# Patient Record
Sex: FEMALE | Race: BLACK OR AFRICAN AMERICAN | NOT HISPANIC OR LATINO | URBAN - METROPOLITAN AREA
[De-identification: names, ages, dates, MRNs, and addresses within clinical notes are randomized per-mention and may not be internally consistent; named-entity substitution may affect disease eponyms.]

---

## 2023-10-26 ENCOUNTER — EMERGENCY (EMERGENCY)
Facility: HOSPITAL | Age: 23
LOS: 1 days | Discharge: HOME | End: 2023-10-26
Admitting: STUDENT IN AN ORGANIZED HEALTH CARE EDUCATION/TRAINING PROGRAM
Payer: SELF-PAY

## 2023-10-26 VITALS
SYSTOLIC BLOOD PRESSURE: 122 MMHG | DIASTOLIC BLOOD PRESSURE: 67 MMHG | RESPIRATION RATE: 16 BRPM | HEART RATE: 80 BPM | OXYGEN SATURATION: 100 % | TEMPERATURE: 99 F

## 2023-10-26 PROCEDURE — 99284 EMERGENCY DEPT VISIT MOD MDM: CPT

## 2023-10-26 PROCEDURE — 93971 EXTREMITY STUDY: CPT | Mod: 26,LT

## 2023-10-26 RX ORDER — IBUPROFEN 200 MG
600 TABLET ORAL ONCE
Refills: 0 | Status: COMPLETED | OUTPATIENT
Start: 2023-10-26 | End: 2023-10-26

## 2023-10-26 RX ADMIN — Medication 600 MILLIGRAM(S): at 15:59

## 2023-10-26 NOTE — ED ADULT TRIAGE NOTE - CHIEF COMPLAINT QUOTE
pt sent by urgent care to r/o DVT, c/o left lower leg swelling, lump, and lower leg pain X couple of weeks. reports swelling noted on 10/24/23. denies any past medical history.

## 2023-10-26 NOTE — ED PROVIDER NOTE - NS ED ATTENDING NAME FT
Patient Instructions    Continue taking your vancomycin taper dose until completed.     A referral has been made for FMT at [facility].     Call with any questions or concerns.      On behalf of my care team, I would like to thank you for entrusting us with your care today. It is our goal to provide you and your family with excellent care.      If you have any questions or experience any problems, I encourage you to call or visit our office.  If there is anything that we can do to serve you better, please let us know by calling [phone].     Thank you.   Jenn Martin M.D.    
[place]

## 2023-10-26 NOTE — ED ADULT NURSE NOTE - OBJECTIVE STATEMENT
pt to wellness, a&ox3 and ambulatory at baseline, presents to ED from ortho MD for increased fluid in the knee and to r/o DVT due to calf tenderness. approx 1 month ago pt reports twisting her L knee and heard a "pop" and is no having increased swelling and pain around L knee and L calf. pt able to bear weight, +pedal pulses, +/= sensation. pt denies numbness/tingling, chest pain, sob, n/v/d, recent travel. NAD noted at this time. respirations even and nonlabored on room air. medicated as ordered. comfort and safety maintained. pending US.

## 2023-10-26 NOTE — ED PROVIDER NOTE - NS_EDPROVIDERDISPOUSERTYPE_ED_A_ED
Normal for race
I have personally evaluated and examined the patient. The Attending was available to me as a supervising provider if needed.

## 2023-10-26 NOTE — ED PROVIDER NOTE - PROGRESS NOTE DETAILS
CHANTALE Chatterjee: USr without findings of DVT.   Patient is medically stable for discharge. Strict return precautions given. Patient to follow up with ortho. Patient displays understanding and agreeable with plan, comfortable with discharge plan home.

## 2023-10-26 NOTE — ED PROVIDER NOTE - PATIENT PORTAL LINK FT
You can access the FollowMyHealth Patient Portal offered by [facility] by registering at the following website: [email or web address]. By joining [employer]’s FollowMyHealth portal, you will also be able to view your health information using other applications (apps) compatible with our system.

## 2023-10-26 NOTE — ED PROVIDER NOTE - CLINICAL SUMMARY MEDICAL DECISION MAKING FREE TEXT BOX
22 y/o F with no pmhx sent to the ED by ortho c/o L knee and calf pain and swelling for r/o DVT.     Patient currently afebrile, hemodynamically stable, spO2 100%. Physical exam findings: well-appearing. Normal heart and lung sounds. +L calf tenderness and L knee tenderness to palpation along medial aspect with swelling noted in medial aspect and superior aspect of L knee. Physical exam otherwise unremarkable.  Based on history and physical, differentials include but are not limited to DVT vs knee effusion vs bursitis. Low suspicion for acute fx at this time. Plan to assess patient for acute pathology as listed above with US. Will administer medications for symptomatic relief, follow-up on results, and reassess. If workup negative, likely d/c home with ortho f/u. 24 y/o F with no pmhx sent to the ED by ortho c/o L knee and calf pain and swelling for r/o DVT.     Patient currently afebrile, hemodynamically stable, spO2 100%. Physical exam findings: well-appearing. Normal heart and lung sounds. +L calf tenderness and L knee tenderness to palpation along medial aspect with swelling noted in medial aspect and superior aspect of L knee. Physical exam otherwise unremarkable.  Based on history and physical, differentials include but are not limited to DVT vs knee effusion vs bursitis. Low suspicion for acute fx at this time. Plan to assess patient for acute pathology as listed above with US. Will administer medications for symptomatic relief, follow-up on results, and reassess. If workup negative, likely d/c home with ortho f/u.    CHANTALE Jackson:  I have personally seen and examined this patient.  I have fully participated in the care of this patient. I have reviewed all pertinent clinical information, including history, physical exam, plan and PA fellow's note and agree as noted.

## 2023-10-26 NOTE — ED PROVIDER NOTE - OBJECTIVE STATEMENT
24 y/o F with no pmhx sent to the ED by ortho c/o L knee and calf pain and swelling for r/o DVT. Pt reports she was at work when she twisted her knee and felt a "pop" about a month ago. Pt states that she immediately felt pain after and was using IcyHot with relief of pain. On Tuesday, 10/24/23, pt started to note increased swelling and pain around the L knee and calf. Pt was seen by ortho today for increased fluid in the knee and was referred to ED for r/o DVT given calf tenderness. Pt did not take anything for pain today. Denies recent fever/chills, skin changes or erythema in the LLE, ankle pain or foot pain, extremity numbness/tingling, CP or SOB, inability to ambulate or difficulty with ambulation, recent travel, OCP or hormone use, smoking/etoh/druh use hx. Pt currently menstruating. NKDA. Denies surgical hx.

## 2023-10-26 NOTE — ED PROVIDER NOTE - NSFOLLOWUPINSTRUCTIONS_ED_ALL_ED_FT
You were seen in the ED for knee pain. There was no evidence of a deep venous thrombosis (clot) on the ultrasound. A copy of the result is provided to you.    Keep extremity elevated and wrapped.  Apply cool compresses to affected area for 15 minutes, 3-4 times per day. Advance activity as tolerated. Take Motrin (also sold as Advil or Ibuprofen) 400-600 mg (two or three 200 mg over the counter pills) every 8 hours as needed for moderate pain or fevers-- take with food; do not take for more than 5 consecutive days.    Please follow-up with your orthopedist for further management and care.     Return to the ER for worsening or persistent symptoms, and/or ANY NEW OR CONCERNING SYMPTOMS. including worsening knee pain or swelling that is not controlled with medications, changes in skin of leg, numbness/tingling, weakness in the leg, inability to move the leg or walk, fever/chills.    Take ibuprofen 400 mg and acetaminophen 650mg every 6 hours as needed for pain    Knee Pain    WHAT YOU NEED TO KNOW:    Knee pain may start suddenly, or it may be a long-term problem. You may have pain on the side, front, or back of your knee. You may have knee stiffness and swelling. You may hear popping sounds or feel like your knee is giving way or locking up as you walk. You may feel pain when you sit, stand, walk, or climb up and down stairs. Knee pain can be caused by conditions such as obesity, inflammation, or strains or tears in ligaments or tendons.    DISCHARGE INSTRUCTIONS:    Return to the emergency department if:    Your pain is worse, even after treatment.    You cannot bend or straighten your leg completely.    The swelling around your knee does not go down even with treatment.    Your knee is painful and hot to the touch.  Contact your healthcare provider if:    You have questions or concerns about your condition or care.    Medicines: You may need any of the following:    NSAIDs help decrease swelling and pain or fever. This medicine is available with or without a doctor's order. NSAIDs can cause stomach bleeding or kidney problems in certain people. If you take blood thinner medicine, always ask your healthcare provider if NSAIDs are safe for you. Always read the medicine label and follow directions.    Acetaminophen decreases pain and fever. It is available without a doctor's order. Ask how much to take and how often to take it. Follow directions. Read the labels of all other medicines you are using to see if they also contain acetaminophen, or ask your doctor or pharmacist. Acetaminophen can cause liver damage if not taken correctly. Do not use more than 4 grams (4,000 milligrams) total of acetaminophen in one day.    Prescription pain medicine may be given. Ask your healthcare provider how to take this medicine safely. Some prescription pain medicines contain acetaminophen. Do not take other medicines that contain acetaminophen without talking to your healthcare provider. Too much acetaminophen may cause liver damage. Prescription pain medicine may cause constipation. Ask your healthcare provider how to prevent or treat constipation.    Take your medicine as directed. Contact your healthcare provider if you think your medicine is not helping or if you have side effects. Tell him or her if you are allergic to any medicine. Keep a list of the medicines, vitamins, and herbs you take. Include the amounts, and when and why you take them. Bring the list or the pill bottles to follow-up visits. Carry your medicine list with you in case of an emergency.  What you can do to manage your symptoms:    Rest your knee so it can heal. Limit activities that increase your pain. Do low-impact exercises, such as walking or swimming.    Apply ice to help reduce swelling and pain. Use an ice pack, or put crushed ice in a plastic bag. Cover it with a towel before you apply it to your knee. Apply ice for 15 to 20 minutes every hour, or as directed.    Apply compression to help reduce swelling. Use a brace or bandage only as directed.    Elevate your knee to help decrease pain and swelling. Elevate your knee while you are sitting or lying down. Prop your leg on pillows to keep your knee above the level of your heart.    Prevent your knee from moving as directed. Your healthcare provider may put on a cast or splint. You may need to wear a leg brace to stabilize your knee. A leg brace can be adjusted to increase your range of motion as your knee heals.  Hinged Knee Braces   What you can do to prevent knee pain:    Maintain a healthy weight. Extra weight increases your risk for knee pain. Ask your healthcare provider how much you should weigh. He or she can help you create a safe weight loss plan if you need to lose weight.    Exercise or train properly. Use the correct equipment for sports. Wear shoes that provide good support. Check your posture often as you exercise, play sports, or train for an event. This can help prevent stress and strain on your knees. Rest between sessions so you do not overwork your knees.  Follow up with your healthcare provider within 24 hours or as directed: You may need follow-up treatments, such as steroid injections to decrease pain. Write down your questions so you remember to ask them during your visits.

## 2023-10-26 NOTE — ED PROVIDER NOTE - PHYSICAL EXAMINATION
General: Well appearing in no acute distress, alert and cooperative  Neck: Soft and supple  Cardiac: Regular rate and regular rhythm, no murmurs, peripheral pulses 2+ and symmetric in b/l lower extremities. +L calf tenderness. No palpable cords. No overlying erythema or swelling in calf.   Resp: Unlabored respiratory effort, lungs CTAB, speaking in full sentences, no wheezes  Abd: Soft, non-tender, non-distended, no guarding or rebound tenderness  MSK: +L knee pain along medial aspect of knee with swelling noted in medial aspect and superior aspect. No overlying erythema or breaks in skin. Limited flexion and extension of L knee 2/2 pain. DP and PT pulses intact in LLE. Sensation grossly intact. Motor 5/5 and strength 5/5 in LLE.  Skin: Warm and dry, no rashes/abrasions/lacerations  Neuro: AO x 3, moves all extremities symmetrically, Motor strength 5/5 bilateral LE, sensation grossly intact.

## 2024-03-13 ENCOUNTER — NON-APPOINTMENT (OUTPATIENT)
Age: 24
End: 2024-03-13

## 2024-04-18 ENCOUNTER — NON-APPOINTMENT (OUTPATIENT)
Age: 24
End: 2024-04-18

## 2024-04-22 PROBLEM — Z00.00 ENCOUNTER FOR PREVENTIVE HEALTH EXAMINATION: Status: ACTIVE | Noted: 2024-04-22
